# Patient Record
Sex: FEMALE | Race: BLACK OR AFRICAN AMERICAN | NOT HISPANIC OR LATINO | URBAN - METROPOLITAN AREA
[De-identification: names, ages, dates, MRNs, and addresses within clinical notes are randomized per-mention and may not be internally consistent; named-entity substitution may affect disease eponyms.]

---

## 2019-12-24 ENCOUNTER — EMERGENCY (EMERGENCY)
Facility: HOSPITAL | Age: 24
LOS: 1 days | Discharge: HOME | End: 2019-12-24
Attending: EMERGENCY MEDICINE | Admitting: EMERGENCY MEDICINE
Payer: COMMERCIAL

## 2019-12-24 VITALS
TEMPERATURE: 98 F | RESPIRATION RATE: 16 BRPM | OXYGEN SATURATION: 98 % | SYSTOLIC BLOOD PRESSURE: 115 MMHG | DIASTOLIC BLOOD PRESSURE: 78 MMHG | HEART RATE: 87 BPM

## 2019-12-24 VITALS
HEART RATE: 98 BPM | SYSTOLIC BLOOD PRESSURE: 128 MMHG | DIASTOLIC BLOOD PRESSURE: 82 MMHG | TEMPERATURE: 99 F | RESPIRATION RATE: 17 BRPM | OXYGEN SATURATION: 97 %

## 2019-12-24 LAB
FLU A RESULT: SIGNIFICANT CHANGE UP
FLU A RESULT: SIGNIFICANT CHANGE UP
FLUAV AG NPH QL: SIGNIFICANT CHANGE UP
FLUBV AG NPH QL: DETECTED
RSV RESULT: SIGNIFICANT CHANGE UP
RSV RNA RESP QL NAA+PROBE: SIGNIFICANT CHANGE UP

## 2019-12-24 PROCEDURE — 99284 EMERGENCY DEPT VISIT MOD MDM: CPT | Mod: 25

## 2019-12-24 PROCEDURE — 71046 X-RAY EXAM CHEST 2 VIEWS: CPT

## 2019-12-24 PROCEDURE — 71046 X-RAY EXAM CHEST 2 VIEWS: CPT | Mod: 26

## 2019-12-24 PROCEDURE — 87631 RESP VIRUS 3-5 TARGETS: CPT

## 2019-12-24 PROCEDURE — 94640 AIRWAY INHALATION TREATMENT: CPT

## 2019-12-24 RX ORDER — ALBUTEROL 90 UG/1
2 AEROSOL, METERED ORAL
Qty: 1 | Refills: 0
Start: 2019-12-24 | End: 2019-12-30

## 2019-12-24 RX ORDER — ONDANSETRON 8 MG/1
1 TABLET, FILM COATED ORAL
Qty: 9 | Refills: 0
Start: 2019-12-24 | End: 2019-12-26

## 2019-12-24 RX ORDER — IPRATROPIUM/ALBUTEROL SULFATE 18-103MCG
3 AEROSOL WITH ADAPTER (GRAM) INHALATION ONCE
Refills: 0 | Status: COMPLETED | OUTPATIENT
Start: 2019-12-24 | End: 2019-12-24

## 2019-12-24 RX ADMIN — Medication 75 MILLIGRAM(S): at 21:59

## 2019-12-24 RX ADMIN — Medication 100 MILLIGRAM(S): at 21:59

## 2019-12-24 RX ADMIN — Medication 3 MILLILITER(S): at 21:01

## 2019-12-24 NOTE — ED PROVIDER NOTE - OBJECTIVE STATEMENT
23 y/o health F with no PMHx presents to the ED with 2-3 weeks of cold-like symptoms, including dry cough, subjective fevers, and chills; no associated nausea, vomiting, diarrhea or headache. Pt reports sore throat last week that has now resolved. Pt states her cough was unrelieved by OTC cough suppressants. Pt reports normal appetite, and denies smoking/vaping, calf tenderness, leg swelling, chest pain, or recent long car/plane trips. Pt has not received this year's flu vaccine. 23 y/o health F with no PMHx presents to the ED with 2-3 weeks of cold-like symptoms, including dry cough, subjective fevers, and chills; no associated nausea, vomiting, diarrhea or headache. Pt reports sore throat last week that has now resolved. Pt states her cough was unrelieved by OTC cough suppressants. Pt reports normal appetite, and denies smoking/vaping, calf tenderness, leg swelling, chest pain, or recent long car/plane trips. Pt has not received this year's flu vaccine. LMP: 12/20

## 2019-12-24 NOTE — ED PROVIDER NOTE - CARE PROVIDERS DIRECT ADDRESSES
[email or web address]

## 2019-12-24 NOTE — ED PROVIDER NOTE - CARE PROVIDER_API CALL
Silvana Hedrick)  Critical Care Medicine; Pulmonary Disease  [address] [place] [address]  Phone: [phone]  Fax: [phone]  Follow Up Time: 1-3 Days

## 2019-12-24 NOTE — ED PROVIDER NOTE - PATIENT PORTAL LINK FT
You can access the FollowMyHealth Patient Portal offered by [facility] by registering at the following website: [email or web address]. By joining [employer]’s FollowMyHealth portal, you will also be able to view your health information using other applications (apps) compatible with our system.

## 2019-12-24 NOTE — ED ADULT NURSE NOTE - OBJECTIVE STATEMENT
patient states on Dec. 6, symptoms started as sore throat followed by fever, w/ cough x 3 weeks, worse when lying down, fever 104o, took Mucinex today.  States also w/ clogged nose, using nasal spray.  States consumed Robitussin 2 bottles, no relief.  Last week cough w/ lot of phlegm , this week dry cough.  No Tylenol or MOtrin not taken today.  did not have flu shot this season.

## 2019-12-24 NOTE — ED PROVIDER NOTE - CLINICAL SUMMARY MEDICAL DECISION MAKING FREE TEXT BOX
23 y/o F presents with cold-like symptoms for 2-3 weeks. On exam, pt is well appearing, afebrile, nontoxic, lungs clear with no obvious wheezing or rhonchi. In light of continued symptoms unrelieved with OTC cough suppressant, will check CXR, flu swab and administer Duoneb.

## 2019-12-26 ENCOUNTER — EMERGENCY (EMERGENCY)
Facility: HOSPITAL | Age: 24
LOS: 1 days | Discharge: HOME | End: 2019-12-26
Attending: EMERGENCY MEDICINE | Admitting: EMERGENCY MEDICINE
Payer: COMMERCIAL

## 2019-12-26 VITALS
HEART RATE: 119 BPM | RESPIRATION RATE: 17 BRPM | SYSTOLIC BLOOD PRESSURE: 101 MMHG | DIASTOLIC BLOOD PRESSURE: 71 MMHG | OXYGEN SATURATION: 99 % | TEMPERATURE: 100 F | HEIGHT: 65 IN | WEIGHT: 118.83 LBS

## 2019-12-26 VITALS
TEMPERATURE: 98 F | HEART RATE: 86 BPM | OXYGEN SATURATION: 98 % | SYSTOLIC BLOOD PRESSURE: 100 MMHG | DIASTOLIC BLOOD PRESSURE: 68 MMHG | RESPIRATION RATE: 18 BRPM

## 2019-12-26 PROBLEM — Z78.9 OTHER SPECIFIED HEALTH STATUS: Chronic | Status: ACTIVE | Noted: 2019-12-24

## 2019-12-26 LAB
ALBUMIN SERPL ELPH-MCNC: 4.3 G/DL — SIGNIFICANT CHANGE UP (ref 3.3–5)
ALP SERPL-CCNC: 46 U/L — SIGNIFICANT CHANGE UP (ref 40–120)
ALT FLD-CCNC: 13 U/L — SIGNIFICANT CHANGE UP (ref 10–45)
ANION GAP SERPL CALC-SCNC: 12 MMOL/L — SIGNIFICANT CHANGE UP (ref 5–17)
APPEARANCE UR: CLEAR — SIGNIFICANT CHANGE UP
AST SERPL-CCNC: 24 U/L — SIGNIFICANT CHANGE UP (ref 10–40)
BASOPHILS # BLD AUTO: 0.01 K/UL — SIGNIFICANT CHANGE UP (ref 0–0.2)
BASOPHILS NFR BLD AUTO: 0.2 % — SIGNIFICANT CHANGE UP (ref 0–2)
BILIRUB SERPL-MCNC: 0.2 MG/DL — SIGNIFICANT CHANGE UP (ref 0.2–1.2)
BILIRUB UR-MCNC: NEGATIVE — SIGNIFICANT CHANGE UP
BUN SERPL-MCNC: 10 MG/DL — SIGNIFICANT CHANGE UP (ref 7–23)
CALCIUM SERPL-MCNC: 9 MG/DL — SIGNIFICANT CHANGE UP (ref 8.4–10.5)
CHLORIDE SERPL-SCNC: 100 MMOL/L — SIGNIFICANT CHANGE UP (ref 96–108)
CO2 SERPL-SCNC: 24 MMOL/L — SIGNIFICANT CHANGE UP (ref 22–31)
COLOR SPEC: YELLOW — SIGNIFICANT CHANGE UP
CREAT SERPL-MCNC: 0.94 MG/DL — SIGNIFICANT CHANGE UP (ref 0.5–1.3)
DIFF PNL FLD: NEGATIVE — SIGNIFICANT CHANGE UP
EOSINOPHIL # BLD AUTO: 0 K/UL — SIGNIFICANT CHANGE UP (ref 0–0.5)
EOSINOPHIL NFR BLD AUTO: 0 % — SIGNIFICANT CHANGE UP (ref 0–6)
GLUCOSE SERPL-MCNC: 102 MG/DL — HIGH (ref 70–99)
GLUCOSE UR QL: NEGATIVE — SIGNIFICANT CHANGE UP
HCT VFR BLD CALC: 39.9 % — SIGNIFICANT CHANGE UP (ref 34.5–45)
HGB BLD-MCNC: 12.7 G/DL — SIGNIFICANT CHANGE UP (ref 11.5–15.5)
IMM GRANULOCYTES NFR BLD AUTO: 0.2 % — SIGNIFICANT CHANGE UP (ref 0–1.5)
KETONES UR-MCNC: 15 MG/DL
LEUKOCYTE ESTERASE UR-ACNC: NEGATIVE — SIGNIFICANT CHANGE UP
LYMPHOCYTES # BLD AUTO: 0.88 K/UL — LOW (ref 1–3.3)
LYMPHOCYTES # BLD AUTO: 21.2 % — SIGNIFICANT CHANGE UP (ref 13–44)
MCHC RBC-ENTMCNC: 29.8 PG — SIGNIFICANT CHANGE UP (ref 27–34)
MCHC RBC-ENTMCNC: 31.8 GM/DL — LOW (ref 32–36)
MCV RBC AUTO: 93.7 FL — SIGNIFICANT CHANGE UP (ref 80–100)
MONOCYTES # BLD AUTO: 0.71 K/UL — SIGNIFICANT CHANGE UP (ref 0–0.9)
MONOCYTES NFR BLD AUTO: 17.1 % — HIGH (ref 2–14)
NEUTROPHILS # BLD AUTO: 2.55 K/UL — SIGNIFICANT CHANGE UP (ref 1.8–7.4)
NEUTROPHILS NFR BLD AUTO: 61.3 % — SIGNIFICANT CHANGE UP (ref 43–77)
NITRITE UR-MCNC: NEGATIVE — SIGNIFICANT CHANGE UP
NRBC # BLD: 0 /100 WBCS — SIGNIFICANT CHANGE UP (ref 0–0)
PH UR: 5.5 — SIGNIFICANT CHANGE UP (ref 5–8)
PLATELET # BLD AUTO: 240 K/UL — SIGNIFICANT CHANGE UP (ref 150–400)
POTASSIUM SERPL-MCNC: 3.6 MMOL/L — SIGNIFICANT CHANGE UP (ref 3.5–5.3)
POTASSIUM SERPL-SCNC: 3.6 MMOL/L — SIGNIFICANT CHANGE UP (ref 3.5–5.3)
PROT SERPL-MCNC: 7.2 G/DL — SIGNIFICANT CHANGE UP (ref 6–8.3)
PROT UR-MCNC: NEGATIVE MG/DL — SIGNIFICANT CHANGE UP
RBC # BLD: 4.26 M/UL — SIGNIFICANT CHANGE UP (ref 3.8–5.2)
RBC # FLD: 13.1 % — SIGNIFICANT CHANGE UP (ref 10.3–14.5)
SODIUM SERPL-SCNC: 136 MMOL/L — SIGNIFICANT CHANGE UP (ref 135–145)
SP GR SPEC: >=1.03 — SIGNIFICANT CHANGE UP (ref 1–1.03)
UROBILINOGEN FLD QL: 0.2 E.U./DL — SIGNIFICANT CHANGE UP
WBC # BLD: 4.16 K/UL — SIGNIFICANT CHANGE UP (ref 3.8–10.5)
WBC # FLD AUTO: 4.16 K/UL — SIGNIFICANT CHANGE UP (ref 3.8–10.5)

## 2019-12-26 PROCEDURE — 85025 COMPLETE CBC W/AUTO DIFF WBC: CPT

## 2019-12-26 PROCEDURE — 93010 ELECTROCARDIOGRAM REPORT: CPT

## 2019-12-26 PROCEDURE — 99284 EMERGENCY DEPT VISIT MOD MDM: CPT | Mod: 25

## 2019-12-26 PROCEDURE — 82962 GLUCOSE BLOOD TEST: CPT

## 2019-12-26 PROCEDURE — 93005 ELECTROCARDIOGRAM TRACING: CPT

## 2019-12-26 PROCEDURE — 99284 EMERGENCY DEPT VISIT MOD MDM: CPT

## 2019-12-26 PROCEDURE — 81003 URINALYSIS AUTO W/O SCOPE: CPT

## 2019-12-26 PROCEDURE — 70450 CT HEAD/BRAIN W/O DYE: CPT | Mod: 26

## 2019-12-26 PROCEDURE — 72125 CT NECK SPINE W/O DYE: CPT

## 2019-12-26 PROCEDURE — 70450 CT HEAD/BRAIN W/O DYE: CPT

## 2019-12-26 PROCEDURE — 80053 COMPREHEN METABOLIC PANEL: CPT

## 2019-12-26 PROCEDURE — 36415 COLL VENOUS BLD VENIPUNCTURE: CPT

## 2019-12-26 PROCEDURE — 72125 CT NECK SPINE W/O DYE: CPT | Mod: 26

## 2019-12-26 RX ORDER — SODIUM CHLORIDE 9 MG/ML
1000 INJECTION INTRAMUSCULAR; INTRAVENOUS; SUBCUTANEOUS ONCE
Refills: 0 | Status: COMPLETED | OUTPATIENT
Start: 2019-12-26 | End: 2019-12-26

## 2019-12-26 RX ORDER — IBUPROFEN 200 MG
600 TABLET ORAL ONCE
Refills: 0 | Status: COMPLETED | OUTPATIENT
Start: 2019-12-26 | End: 2019-12-26

## 2019-12-26 RX ADMIN — SODIUM CHLORIDE 2000 MILLILITER(S): 9 INJECTION INTRAMUSCULAR; INTRAVENOUS; SUBCUTANEOUS at 10:40

## 2019-12-26 RX ADMIN — SODIUM CHLORIDE 2000 MILLILITER(S): 9 INJECTION INTRAMUSCULAR; INTRAVENOUS; SUBCUTANEOUS at 11:55

## 2019-12-26 RX ADMIN — Medication 600 MILLIGRAM(S): at 10:51

## 2019-12-26 NOTE — ED PROVIDER NOTE - NSFOLLOWUPCLINICS_GEN_ALL_ED_FT
[facility]  Family Medicine  [address]  [place], [place] [address]  Phone: [phone]  Fax:   Follow Up Time: 4-6 Days

## 2019-12-26 NOTE — ED PROVIDER NOTE - ENMT, MLM
Airway patent, Nasal mucosa clear. Mouth with normal mucosa. Throat has no vesicles, no oropharyngeal exudates and uvula is midline. Tenderness to left lateral cervical spine, no midline tenderness.

## 2019-12-26 NOTE — ED PROVIDER NOTE - CLINICAL SUMMARY MEDICAL DECISION MAKING FREE TEXT BOX
24y F presents after syncope and hitting head. Plan for fluids, labs, CT head and neck, and reevaluate. 24y F presents after syncope and hitting her head. Plan for fluids, labs, CT head and neck, and reevaluate.  ED course: labs WNL. Syncope likely sec to having the flu and being weak and sick. IVF given and pt feeling much better. Labs/ EKG noted and with no acute findings. CTs with NAD. Pt reassured and to f/up outpt. 24y F presents after syncope and hitting her head. Plan for fluids, labs, CT head and neck, and reevaluate.  ED course: labs WNL. Syncope likely sec to having the flu and being weak and sick. IVF given and pt feeling much better. Labs with no acute findings. EKg with sinus tachycardia, no acute ST changes. CTs with NAD. Pt reassured and to f/up outpt.

## 2019-12-26 NOTE — ED PROVIDER NOTE - OBJECTIVE STATEMENT
24y female with history of the flu diagnosed here 2 days ago presents to the ED with complains of syncope 4 hours ago. Patient reports getting up from a chair, feeling dizzy and passing out, hitting the back of her head on the wall. Patients mother reports being unconscious for 2-3 minutes. Patient endorses neck pain. Denies chest pain, difficulty breathing, nausea. Patient was seen here two days ago and chest x-ray was normal. 24y female with history of the flu diagnosed at [facility] ED 2 days ago presents to the ED with complaint of syncope 4 hours ago. Patient reports getting up from a chair, feeling dizzy and passing out, hitting the back of her head on the wall. Patients family reports her being unconscious for 2-3 minutes. Patient endorses neck pain. Denies chest pain, difficulty breathing, nausea, sz-activity, focal neuro sxs. Patient was seen here two days ago and chest x-ray was normal.

## 2019-12-26 NOTE — ED ADULT NURSE NOTE - OBJECTIVE STATEMENT
Patient alert and oriented x 3 came c/o neck discomfort and syncopal episode upon waking up this am , stated that she passed out for about 2-3 mins . Pt. denies any dizziness , headache , nausea , chest pain nor sob at this time ., Diagnosed with positive flu and been taking her meds  regularly . Still with fever and productive cough . Not in distress , Seen and examined by Dr. Howe . all labs sent ,. IVF in progress . Will continue to monitor ,.

## 2019-12-26 NOTE — ED PROVIDER NOTE - PATIENT PORTAL LINK FT
You can access the FollowMyHealth Patient Portal offered by [facility] by registering at the following website: [email or web address]. By joining [employer]’s FollowMyHealth portal, you will also be able to view your health information using other applications (apps) compatible with our system.

## 2019-12-26 NOTE — ED PROVIDER NOTE - NSFOLLOWUPINSTRUCTIONS_ED_ALL_ED_FT
Please follow up with your primary care doctor in 2-3 days.     Syncope    Syncope is when you temporarily lose consciousness, also called fainting or passing out. It is caused by a sudden decrease in blood flow to the brain. Even though most causes of syncope are not dangerous, syncope can possibly be a sign of a serious medical problem. Signs that you may be about to faint include feeling dizzy, lightheaded, nausea, visual changes, or cold/clammy skin. Do not drive, operate heavy machinery, or play sports until your health care provider says it is okay.    SEEK IMMEDIATE MEDICAL CARE IF YOU HAVE ANY OF THE FOLLOWING SYMPTOMS: severe headache, pain in your chest/abdomen/back, bleeding from your mouth or rectum, palpitations, shortness of breath, pain with breathing, seizure, confusion, or trouble walking.    Closed Head Injury    A closed head injury is an injury to your head that may or may not involve a traumatic brain injury (TBI). Symptoms of TBI can be short or long lasting and include headache, dizziness, interference with memory or speech, fatigue, confusion, changes in sleep, mood changes, nausea, depression/anxiety, and dulling of senses. Make sure to obtain proper rest which includes getting plenty of sleep, avoiding excessive visual stimulation, and avoiding activities that may cause physical or mental stress. Avoid any situation where there is potential for another head injury, including sports.    SEEK IMMEDIATE MEDICAL CARE IF YOU HAVE ANY OF THE FOLLOWING SYMPTOMS: unusual drowsiness, vomiting, severe dizziness, seizures, lightheadedness, muscular weakness, different pupil sizes, visual changes, or clear or bloody discharge from your ears or nose.

## 2019-12-26 NOTE — ED ADULT NURSE NOTE - CHPI ED NUR SYMPTOMS NEG
no confusion/no vomiting/no weakness/no change in level of consciousness/no blurred vision/no nausea/no numbness

## 2019-12-26 NOTE — ED ADULT TRIAGE NOTE - CHIEF COMPLAINT QUOTE
Patient complaining of syncopal episode this morning.  Patient states recently Dx of Flu B, temp of 100.8 today, did not take any antipyretics.  Patient denies any CP, SOB, N/V or any other complaints at this time.  , EKG in progress.

## 2019-12-30 DIAGNOSIS — R50.9 FEVER, UNSPECIFIED: ICD-10-CM

## 2019-12-30 DIAGNOSIS — J10.1 INFLUENZA DUE TO OTHER IDENTIFIED INFLUENZA VIRUS WITH OTHER RESPIRATORY MANIFESTATIONS: ICD-10-CM

## 2020-01-07 DIAGNOSIS — R55 SYNCOPE AND COLLAPSE: ICD-10-CM

## 2020-03-12 NOTE — ED PROVIDER NOTE - TOBACCO USE
Detail Level: Detailed
Add 78217 Cpt? (Important Note: In 2017 The Use Of 59949 Is Being Tracked By Cms To Determine Future Global Period Reimbursement For Global Periods): no
Never smoker

## 2022-04-27 NOTE — ED ADULT TRIAGE NOTE - TEMPERATURE IN FAHRENHEIT (DEGREES F)
98.7
Contreras Fishman)  ColonRectal Surgery; Surgery  [address], [address]  [place] [address]  Phone: [phone]  Fax: [phone]  Follow Up Time:

## 2022-05-17 NOTE — ED PROVIDER NOTE - CPE EDP EYES NORM
Radha requested a refill of WELLBUTRIN  mg qty 90   Patient has a follow up on 8/18/2022.     
normal...

## 2023-09-12 NOTE — ED PROVIDER NOTE - DISPOSITION TYPE
No care due was identified.  [facility] Embedded Care Due Messages. Reference number: [id number].   9/12/2023 4:01:12 PM CDT   DISCHARGE

## 2023-10-06 NOTE — ED PROVIDER NOTE - CHPI ED SYMPTOMS NEG
Colon & Rectal Surgery Clinic Follow Up    HPI:   Arpita Bradshaw is a 54 y.o. female who presents for follow up of thrombosed hemorrhoid    States that hemorrhoid burst after last visit.  Pain is improved, still has swollen lump though.  Would still like to proceed with surgery as she is having difficulty with hygiene.       Objective:   Vitals:    10/06/23 1423   BP: 137/73   Resp: 19        Physical Exam   Gen:well developed female, NAD  HEENT: normocephalic, atraumatic, PERRL, EOMI   CV: RRR, no murmurs  Resp: nonlabored, CTAB   Abd: soft, NTND   MSK: no gross deformities, no cyanosis or edema   Anorectal: resolving thrombosed external hemorrhoid in the right lateral position, granular mucosa in anterior midline       Assessment and Plan:   Arpita Bradshaw  is a 54 y.o. female who presents for follow up of thrombosed hemorrhoid    - thrombosed clot has evacuated but there is still residual disease   - patient would still like to proceed with excisional hemorrhoidectomy and excisional biopsy of abnormal mucosa  - to OR 10/18      Joelle Parks MD  Staff Surgeon   Colon & Rectal Surgery        
no vomiting/no nausea, no vomiting, no diarrhea, no headache, no abdominal pain, no leg swelling, no chest pain, no calf tenderness

## 2024-03-27 ENCOUNTER — EMERGENCY (EMERGENCY)
Facility: HOSPITAL | Age: 29
LOS: 1 days | Discharge: HOME | End: 2024-03-27
Attending: STUDENT IN AN ORGANIZED HEALTH CARE EDUCATION/TRAINING PROGRAM | Admitting: STUDENT IN AN ORGANIZED HEALTH CARE EDUCATION/TRAINING PROGRAM
Payer: MEDICAID

## 2024-03-27 VITALS
SYSTOLIC BLOOD PRESSURE: 100 MMHG | DIASTOLIC BLOOD PRESSURE: 64 MMHG | OXYGEN SATURATION: 100 % | RESPIRATION RATE: 20 BRPM | HEART RATE: 96 BPM

## 2024-03-27 VITALS
WEIGHT: 130.07 LBS | OXYGEN SATURATION: 100 % | TEMPERATURE: 98 F | HEIGHT: 65 IN | DIASTOLIC BLOOD PRESSURE: 81 MMHG | HEART RATE: 101 BPM | RESPIRATION RATE: 18 BRPM | SYSTOLIC BLOOD PRESSURE: 116 MMHG

## 2024-03-27 DIAGNOSIS — M79.662 PAIN IN LEFT LOWER LEG: ICD-10-CM

## 2024-03-27 PROCEDURE — 99284 EMERGENCY DEPT VISIT MOD MDM: CPT | Mod: 25

## 2024-03-27 PROCEDURE — 99284 EMERGENCY DEPT VISIT MOD MDM: CPT

## 2024-03-27 PROCEDURE — 93971 EXTREMITY STUDY: CPT

## 2024-03-27 PROCEDURE — 93971 EXTREMITY STUDY: CPT | Mod: 26,LT

## 2024-03-27 NOTE — ED PROVIDER NOTE - PATIENT PORTAL LINK FT
You can access the FollowMyHealth Patient Portal offered by [facility] by registering at the following website: [email or web address]. By joining [employer]’s FollowMyHealth portal, you will also be able to view your health information using other applications (apps) compatible with our system.

## 2024-03-27 NOTE — ED PROVIDER NOTE - NSFOLLOWUPINSTRUCTIONS_ED_ALL_ED_FT
Thank you for visiting [facility] Emergency Department.      Please know that no emergency visit is complete without follow-up with your primary care provider within 1 week.  Please bring copies of all discharge papers and results and show to your doctor.    Please continue taking all previous medications as instructed unless we discussed otherwise.   I appreciated your patience and hope you feel better soon.     You may call ER referral coordinator at [phone] if you need help scheduling your follow up appointments with neurologist.

## 2024-03-27 NOTE — ED PROVIDER NOTE - ATTENDING APP SHARED VISIT CONTRIBUTION OF CARE
27 yo female in the Er c/o left calf cramping pain for the past 4 days. Pt reports she was hoping that pain would go away but its not resolving. Pt denies any injury to her left leg, denies swelling or discolorations. pt is concerned about possible dvt because she has strong family h/o of DVt's/ Denies SOB, CP, prolong immobilizations, OCP intake.   Pt well appearing, has unremarkable exam- low suspicion for dvt, but pt is very concerned and anxious about it. will check doppler.   pt also mentioned h/o intermittent brief, usually less then a minutes episodes of muscle cramps? spasms? twitching? all over her body in different places on and off for the past couple month. Pt had negative MRI and has been f/u with neurologist. Pt has been prescribed Vit. B and Vit. D to take. She would like to get second opinion on her symptoms from a different neurologist. will provide an info about neurology clinic and ER Referral coordinator to help pt schedule her f/u appointment.

## 2024-03-27 NOTE — ED ADULT NURSE NOTE - OBJECTIVE STATEMENT
Pt is a 29yo female presenting to ED c/o calf pain. Pt states, "I have had 8/10 intermittent cramping calf pain for the past 4 days, only partially relieved when I used a heating pad. I have a history of spinal jerks since September that my PCP sent me to a nerve doctor for who ordered an MRI that came back negative and prescribed me vitamin B12 which helped at first but the jerks came back so then I was prescribed both vitamin B12 and gabapentin but I still experience them." Pt's mother reports family history of blood clots. Pt is A&Ox4, breathing even and unlabored speaking in clear full sentences.

## 2024-03-27 NOTE — ED PROVIDER NOTE - PHYSICAL EXAMINATION
Constitutional: awake and alert, in no acute distress  HEENT: head normocephalic and atraumatic. moist mucous membranes  Eyes: extraocular movements intact, normal conjunctiva  Neck: supple, normal ROM  Cardiovascular: regular rate   Pulmonary: no respiratory distress  Gastrointestinal: abdomen flat and nondistended  Skin: warm, dry, normal for ethnicity  Musculoskeletal: no edema, no deformity, NROM, left calf tenderness, no edema, no discolorations, good distal pulses, left knee and ankle joint normal exam  Neurological: oriented x4, no focal neurologic deficit.   Psychiatric: calm and cooperative, no SI/HI

## 2024-03-27 NOTE — ED ADULT TRIAGE NOTE - CHIEF COMPLAINT QUOTE
Pt presents to ED C/O reoccurring LLE pain/ cramping worsening x 4 days. States, " I have a hx of a full body jerking sensation, they did an MRI on my back last year and didn't find anything I saw neurology and they put me on gabapentin and b12 but it didn't help". Pt able to ambulate independently. Neuro intact on arrival. Pt states, " I want an opinion on my leg and my hx".

## 2024-03-27 NOTE — ED PROVIDER NOTE - CARE PROVIDER_API CALL
Yoselyn Monson  Neurology  [address] [address]  Phone: [phone]  Fax: [phone]  Follow Up Time:

## 2024-03-27 NOTE — ED PROVIDER NOTE - OBJECTIVE STATEMENT
29 yo female in the Er c/o left calf cramping pain for the past 4 days. Pt reports she was hoping that pain would go away but its not resolving. Pt denies any injury to her left leg, denies swelling or discolorations. pt is concerned about possible dvt because she has strong family h/o of DVt's/ Denies SOB, CP, prolong immobilizations, OCP intake.

## 2024-03-27 NOTE — ED ADULT NURSE NOTE - NS PRO PASSIVE SMOKE EXP
Please schedule f/u appt.   rx for jardiance sent. No refills will be sent until she is seen.     jardiance also requires PA so rx was sent to [facility].    Unknown
